# Patient Record
Sex: FEMALE | Race: OTHER | HISPANIC OR LATINO | URBAN - METROPOLITAN AREA
[De-identification: names, ages, dates, MRNs, and addresses within clinical notes are randomized per-mention and may not be internally consistent; named-entity substitution may affect disease eponyms.]

---

## 2017-03-18 ENCOUNTER — EMERGENCY (EMERGENCY)
Facility: HOSPITAL | Age: 22
LOS: 1 days | Discharge: HOME | End: 2017-03-18
Attending: EMERGENCY MEDICINE | Admitting: EMERGENCY MEDICINE
Payer: COMMERCIAL

## 2017-03-18 VITALS
TEMPERATURE: 98 F | SYSTOLIC BLOOD PRESSURE: 100 MMHG | RESPIRATION RATE: 18 BRPM | DIASTOLIC BLOOD PRESSURE: 62 MMHG | OXYGEN SATURATION: 99 % | HEART RATE: 84 BPM

## 2017-03-18 VITALS
OXYGEN SATURATION: 100 % | HEART RATE: 90 BPM | TEMPERATURE: 98 F | DIASTOLIC BLOOD PRESSURE: 68 MMHG | RESPIRATION RATE: 16 BRPM | SYSTOLIC BLOOD PRESSURE: 88 MMHG

## 2017-03-18 DIAGNOSIS — I47.1 SUPRAVENTRICULAR TACHYCARDIA: ICD-10-CM

## 2017-03-18 DIAGNOSIS — R00.2 PALPITATIONS: ICD-10-CM

## 2017-03-18 LAB
ALBUMIN SERPL ELPH-MCNC: 3.8 G/DL — SIGNIFICANT CHANGE UP (ref 3.4–5)
ALP SERPL-CCNC: 54 U/L — SIGNIFICANT CHANGE UP (ref 40–120)
ALT FLD-CCNC: 27 U/L — SIGNIFICANT CHANGE UP (ref 12–42)
ANION GAP SERPL CALC-SCNC: 7 MMOL/L — LOW (ref 9–16)
AST SERPL-CCNC: 27 U/L — SIGNIFICANT CHANGE UP (ref 15–37)
BASOPHILS NFR BLD AUTO: 0.4 % — SIGNIFICANT CHANGE UP (ref 0–2)
BILIRUB SERPL-MCNC: 0.4 MG/DL — SIGNIFICANT CHANGE UP (ref 0.2–1.2)
BUN SERPL-MCNC: 22 MG/DL — SIGNIFICANT CHANGE UP (ref 7–23)
CALCIUM SERPL-MCNC: 9.4 MG/DL — SIGNIFICANT CHANGE UP (ref 8.5–10.5)
CHLORIDE SERPL-SCNC: 105 MMOL/L — SIGNIFICANT CHANGE UP (ref 96–108)
CK MB BLD-MCNC: 1.03 % — SIGNIFICANT CHANGE UP
CK MB CFR SERPL CALC: 0.9 NG/ML — SIGNIFICANT CHANGE UP (ref 0.5–3.6)
CK SERPL-CCNC: 87 U/L — SIGNIFICANT CHANGE UP (ref 26–192)
CO2 SERPL-SCNC: 29 MMOL/L — SIGNIFICANT CHANGE UP (ref 22–31)
CREAT SERPL-MCNC: 0.74 MG/DL — SIGNIFICANT CHANGE UP (ref 0.5–1.3)
EOSINOPHIL NFR BLD AUTO: 0.4 % — SIGNIFICANT CHANGE UP (ref 0–6)
ETHANOL SERPL-MCNC: <3 MG/DL — SIGNIFICANT CHANGE UP
GLUCOSE SERPL-MCNC: 119 MG/DL — HIGH (ref 70–99)
HCG SERPL-ACNC: <1 MIU/ML — SIGNIFICANT CHANGE UP
HCT VFR BLD CALC: 39 % — SIGNIFICANT CHANGE UP (ref 34.5–45)
HGB BLD-MCNC: 13.7 G/DL — SIGNIFICANT CHANGE UP (ref 11.5–15.5)
IMM GRANULOCYTES NFR BLD AUTO: 2 % — HIGH (ref 0–1.5)
LACTATE SERPL-SCNC: 1 MMOL/L — SIGNIFICANT CHANGE UP (ref 0.4–2)
LIDOCAIN IGE QN: 90 U/L — SIGNIFICANT CHANGE UP (ref 73–393)
LYMPHOCYTES # BLD AUTO: 23.7 % — SIGNIFICANT CHANGE UP (ref 13–44)
MAGNESIUM SERPL-MCNC: 1.9 MG/DL — SIGNIFICANT CHANGE UP (ref 1.6–2.4)
MCHC RBC-ENTMCNC: 33.2 PG — SIGNIFICANT CHANGE UP (ref 27–34)
MCHC RBC-ENTMCNC: 35.1 G/DL — SIGNIFICANT CHANGE UP (ref 32–36)
MCV RBC AUTO: 94.4 FL — SIGNIFICANT CHANGE UP (ref 80–100)
MONOCYTES NFR BLD AUTO: 7.1 % — SIGNIFICANT CHANGE UP (ref 2–14)
NEUTROPHILS NFR BLD AUTO: 66.4 % — SIGNIFICANT CHANGE UP (ref 43–77)
NT-PROBNP SERPL-SCNC: 343 PG/ML — HIGH
PLATELET # BLD AUTO: 255 K/UL — SIGNIFICANT CHANGE UP (ref 150–400)
POTASSIUM SERPL-MCNC: 4.1 MMOL/L — SIGNIFICANT CHANGE UP (ref 3.5–5.3)
POTASSIUM SERPL-SCNC: 4.1 MMOL/L — SIGNIFICANT CHANGE UP (ref 3.5–5.3)
PROT SERPL-MCNC: 7.6 G/DL — SIGNIFICANT CHANGE UP (ref 6.4–8.2)
RBC # BLD: 4.13 M/UL — SIGNIFICANT CHANGE UP (ref 3.8–5.2)
RBC # FLD: 11.6 % — SIGNIFICANT CHANGE UP (ref 10.3–16.9)
SODIUM SERPL-SCNC: 141 MMOL/L — SIGNIFICANT CHANGE UP (ref 132–145)
TROPONIN I SERPL-MCNC: <0.017 NG/ML — LOW (ref 0.02–0.06)
TSH SERPL-MCNC: 0.6 UIU/ML — SIGNIFICANT CHANGE UP (ref 0.36–3.74)
WBC # BLD: 7.1 K/UL — SIGNIFICANT CHANGE UP (ref 3.8–10.5)
WBC # FLD AUTO: 7.1 K/UL — SIGNIFICANT CHANGE UP (ref 3.8–10.5)

## 2017-03-18 PROCEDURE — 99285 EMERGENCY DEPT VISIT HI MDM: CPT | Mod: 25

## 2017-03-18 PROCEDURE — 93010 ELECTROCARDIOGRAM REPORT: CPT

## 2017-03-18 PROCEDURE — 71010: CPT | Mod: 26

## 2017-03-18 RX ORDER — ONDANSETRON 8 MG/1
4 TABLET, FILM COATED ORAL ONCE
Qty: 0 | Refills: 0 | Status: COMPLETED | OUTPATIENT
Start: 2017-03-18 | End: 2017-03-18

## 2017-03-18 RX ORDER — FAMOTIDINE 10 MG/ML
20 INJECTION INTRAVENOUS ONCE
Qty: 0 | Refills: 0 | Status: COMPLETED | OUTPATIENT
Start: 2017-03-18 | End: 2017-03-18

## 2017-03-18 RX ORDER — SODIUM CHLORIDE 9 MG/ML
2000 INJECTION INTRAMUSCULAR; INTRAVENOUS; SUBCUTANEOUS ONCE
Qty: 0 | Refills: 0 | Status: COMPLETED | OUTPATIENT
Start: 2017-03-18 | End: 2017-03-18

## 2017-03-18 RX ADMIN — SODIUM CHLORIDE 2000 MILLILITER(S): 9 INJECTION INTRAMUSCULAR; INTRAVENOUS; SUBCUTANEOUS at 17:19

## 2017-03-18 RX ADMIN — ONDANSETRON 4 MILLIGRAM(S): 8 TABLET, FILM COATED ORAL at 17:20

## 2017-03-18 RX ADMIN — FAMOTIDINE 20 MILLIGRAM(S): 10 INJECTION INTRAVENOUS at 17:20

## 2017-03-18 NOTE — ED PROVIDER NOTE - PROGRESS NOTE DETAILS
normal labs including TSH, BNP. trop, lytes. CXR normal. F/U w Cardio encouraged, informed about vagal maneuvers in case of rapid hert beat in the future normal labs including TSH, BNP. trop, lytes. CXR normal. F/U w Cardio encouraged, informed about vagal maneuvers in case of rapid hert beat in the future. Cardio F/U encouraged, pt in no distress understood instructions.

## 2017-03-18 NOTE — ED PROVIDER NOTE - OBJECTIVE STATEMENT
21 yo F with a hx of PCOS on Aldactone and has not had menses for a year. Pt states she had palpitations and rapid heart beat sensation for 2.5 hours with SOB and slight chest pressure sensation that was short and self limited. Pt was out last night with friends and had some alcohol. Today woke up with nausea. Has had similar episodes in the past. Was diagnosed palpitations with arrythmia in her home [place], [facility], and was told that she may need a procedure, probably an ablation but refused at the time. Has never required cardioversion or IV medication since previous episodes have all been self limited. No Ha, no neck pain. EKG from urgent care showed SVT with HR of 170. Arrythmia broke without any medications. Pt states she currently feels much better.

## 2017-03-18 NOTE — ED PROVIDER NOTE - NS ED MD SCRIBE ATTENDING SCRIBE SECTIONS
DISPOSITION/PHYSICAL EXAM/REVIEW OF SYSTEMS/PAST MEDICAL/SURGICAL/SOCIAL HISTORY/HIV/HISTORY OF PRESENT ILLNESS/VITAL SIGNS( Pullset)

## 2017-03-22 PROBLEM — Z00.00 ENCOUNTER FOR PREVENTIVE HEALTH EXAMINATION: Status: ACTIVE | Noted: 2017-03-22

## 2017-03-27 ENCOUNTER — APPOINTMENT (OUTPATIENT)
Dept: HEART AND VASCULAR | Facility: CLINIC | Age: 22
End: 2017-03-27

## 2017-03-27 VITALS
DIASTOLIC BLOOD PRESSURE: 60 MMHG | WEIGHT: 113 LBS | BODY MASS INDEX: 19.29 KG/M2 | SYSTOLIC BLOOD PRESSURE: 96 MMHG | HEIGHT: 64 IN

## 2017-03-27 VITALS
DIASTOLIC BLOOD PRESSURE: 60 MMHG | BODY MASS INDEX: 19.29 KG/M2 | HEIGHT: 64 IN | WEIGHT: 113 LBS | HEART RATE: 58 BPM | SYSTOLIC BLOOD PRESSURE: 97 MMHG

## 2017-03-27 DIAGNOSIS — R00.2 PALPITATIONS: ICD-10-CM

## 2017-03-27 DIAGNOSIS — I47.1 SUPRAVENTRICULAR TACHYCARDIA: ICD-10-CM

## 2017-03-27 DIAGNOSIS — E28.2 POLYCYSTIC OVARIAN SYNDROME: ICD-10-CM

## 2017-03-27 DIAGNOSIS — R73.09 OTHER ABNORMAL GLUCOSE: ICD-10-CM

## 2017-03-27 RX ORDER — SPIRONOLACTONE 100 MG/1
100 TABLET, FILM COATED ORAL
Refills: 0 | Status: ACTIVE | COMMUNITY

## 2017-03-30 LAB
ANION GAP SERPL CALC-SCNC: 15 MMOL/L
BUN SERPL-MCNC: 18 MG/DL
CALCIUM SERPL-MCNC: 10.1 MG/DL
CHLORIDE SERPL-SCNC: 99 MMOL/L
CHOLEST SERPL-MCNC: 189 MG/DL
CHOLEST/HDLC SERPL: 1.5 RATIO
CO2 SERPL-SCNC: 25 MMOL/L
CREAT SERPL-MCNC: 0.85 MG/DL
GLUCOSE SERPL-MCNC: 88 MG/DL
HBA1C MFR BLD HPLC: 5 %
HDLC SERPL-MCNC: 129 MG/DL
LDLC SERPL CALC-MCNC: 49 MG/DL
MAGNESIUM SERPL-MCNC: 1.8 MG/DL
POTASSIUM SERPL-SCNC: 4.4 MMOL/L
SODIUM SERPL-SCNC: 139 MMOL/L
T3FREE SERPL-MCNC: 3.12 PG/ML
T4 FREE SERPL-MCNC: 1.4 NG/DL
TRIGL SERPL-MCNC: 54 MG/DL
TSH SERPL-ACNC: 1.56 UIU/ML

## 2017-04-18 ENCOUNTER — APPOINTMENT (OUTPATIENT)
Dept: HEART AND VASCULAR | Facility: CLINIC | Age: 22
End: 2017-04-18

## 2017-04-24 ENCOUNTER — APPOINTMENT (OUTPATIENT)
Dept: HEART AND VASCULAR | Facility: CLINIC | Age: 22
End: 2017-04-24

## 2017-04-24 VITALS — SYSTOLIC BLOOD PRESSURE: 92 MMHG | HEART RATE: 64 BPM | DIASTOLIC BLOOD PRESSURE: 55 MMHG

## 2019-10-15 NOTE — ED ADULT TRIAGE NOTE - AS O2 DELIVERY
Health Maintenance Due   Topic Date Due   • Influenza Vaccine (1) 09/01/2019       Patient is due for topics as listed above but is not proceeding with Immunization(s) Influenza at this time. [facility] get [facility]           room air

## 2021-03-01 NOTE — ED PROVIDER NOTE - CROS ED RESP ALL NEG
Called and spoke with patient and reviewed the results of PET scan:  Impression:  1. No evidence of active cardiac sarcoidosis.  2. No evidence of active systemic or pulmonary sarcoidosis.  3. Cholelithiasis.  4. Colonic diverticulosis.  Letter also mailed to patient with results.     
[facility]    Phone Message    May a detailed message be left on voicemail: yes     Reason for Call: Requesting Results   Name/type of test: PET scan  Date of test: 2/12/21  Was test done at a location other than [facility] ?: No      Action Taken: Message routed to:  [facility] Cardio    Travel Screening: Not Applicable                                                                        
- - -

## 2024-05-10 NOTE — ED ADULT TRIAGE NOTE - NS ED NOTE AC HIGH RISK COUNTRIES
No You can access the FollowMyHealth Patient Portal offered by [facility] by registering at the following website: [email or web address]. By joining [facility]’s FollowMyHealth portal, you will also be able to view your health information using other applications (apps) compatible with our system.

## 2025-07-03 NOTE — ED PROVIDER NOTE - CPE EDP GASTRO NORM
No current medications. Managed with diet and exercise. Will update labs as ordered.  Orders:  •  Lipid Panel with Direct LDL reflex; Future  •  CBC and differential; Future  •  Comprehensive metabolic panel; Future   normal... [facility]